# Patient Record
Sex: FEMALE | Race: WHITE
[De-identification: names, ages, dates, MRNs, and addresses within clinical notes are randomized per-mention and may not be internally consistent; named-entity substitution may affect disease eponyms.]

---

## 2018-04-06 ENCOUNTER — HOSPITAL ENCOUNTER (EMERGENCY)
Dept: HOSPITAL 17 - PHEFT | Age: 63
Discharge: HOME | End: 2018-04-06
Payer: COMMERCIAL

## 2018-04-06 VITALS
SYSTOLIC BLOOD PRESSURE: 145 MMHG | TEMPERATURE: 97.6 F | RESPIRATION RATE: 16 BRPM | OXYGEN SATURATION: 97 % | HEART RATE: 75 BPM | DIASTOLIC BLOOD PRESSURE: 65 MMHG

## 2018-04-06 VITALS — BODY MASS INDEX: 36.66 KG/M2 | HEIGHT: 65 IN | WEIGHT: 220.02 LBS

## 2018-04-06 DIAGNOSIS — E78.00: ICD-10-CM

## 2018-04-06 DIAGNOSIS — K21.9: ICD-10-CM

## 2018-04-06 DIAGNOSIS — Z79.82: ICD-10-CM

## 2018-04-06 DIAGNOSIS — E03.9: ICD-10-CM

## 2018-04-06 DIAGNOSIS — E11.9: ICD-10-CM

## 2018-04-06 DIAGNOSIS — Z79.899: ICD-10-CM

## 2018-04-06 DIAGNOSIS — H11.32: Primary | ICD-10-CM

## 2018-04-06 DIAGNOSIS — I10: ICD-10-CM

## 2018-04-06 PROCEDURE — 99283 EMERGENCY DEPT VISIT LOW MDM: CPT

## 2018-04-06 NOTE — PD
HPI


Chief Complaint:  Eye Problems/Injury


Time Seen by Provider:  15:08


Travel History


International Travel<30 days:  No


Contact w/Intl Traveler<30days:  No


Traveled to known affect area:  No





History of Present Illness


HPI


62-year-old female presents to the emergency department for evaluation of 

erythema to the left eye this started this morning.  Patient states she has 

always noticed a large blood vessel to the left lower conjunctiva.  She states 

she rubs her eye this morning and then looked in the mirror and noticed blood.  

Patient denies any trauma to the eye.  She does have history of high blood 

pressure and diabetes.  She denies any visual changes.  She does state that she 

has a slight pressure behind the left eye.  She is not on anticoagulants.  Mild 

severity.  Patient also states that she has had pruritus to her bilateral upper 

extremities for "a long time".  She has not seen her primary care physician for 

it, but that when she was here she would mention it to me.





PFSH


Past Medical History


Hx Anticoagulant Therapy:  Yes (BABY ASA DAILY)


Cardiovascular Problems:  Yes (HTN,CHOL)


Diabetes:  Yes


Patient Takes Glucophage:  Yes (4/6/18 0730)


Diminished Hearing:  No


GERD:  Yes


Hypertension:  Yes


Thyroid Disease:  Yes (hypo)


Tetanus Vaccination:  < 5 Years


Influenza Vaccination:  Yes


Pregnant?:  Not Pregnant





Past Surgical History


Endocrine Surgery:  Yes (thyroid removed)


Hysterectomy:  Yes (partial )





Social History


Alcohol Use:  Yes (occass.)


Tobacco Use:  No


Substance Use:  No





Allergies-Medications


(Allergen,Severity, Reaction):  


Coded Allergies:  


     No Known Allergies (Verified  Adverse Reaction, Unknown, 4/6/18)


Reported Meds & Prescriptions





Reported Meds & Active Scripts


Active


Reported


Melatonin 5 Mg Tab 10 Mg PO HS


Invokana (Canagliflozin) 300 Mg Tab 300 Mg PO DAILY


     Take before 1st meal of day.


Glipizide 10 Mg Tab 10 Mg PO BIDAC


     Take 30 minutes before a meal


Omega-3 Fish Oil/Vitamin (Fish Oil-Cholecalciferol) 1,000-1,000 Mg Cap 1 Cap PO 

BID


Metformin (Metformin HCl) 1,000 Mg Tab 1,000 Mg PO DAILY


     With a meal


Lisinopril 20 Mg Tab 20 Mg PO DAILY


Levothyroxine (Levothyroxine Sodium) 137 Mcg Tab 137 Mcg PO DAILY


Hydrochlorothiazide 50 Mg Tab 50 Mg PO DAILY


Celexa (Citalopram Hydrobromide) 40 Mg Tab 40 Mg PO DAILY


Atenolol 25 Mg Tab 25 Mg PO DAILY


Aspirin 81 (Aspirin) 81 Mg Tabdr 81 Mg PO DAILY


[Omeprazole]   20 Mg PO 


[Calcium]   1,200  BID








Review of Systems


Except as stated in HPI:  all other systems reviewed are Neg





Physical Exam


Narrative


GENERAL: Well-nourished, well-developed female patient, ambulatory.  Afebrile


SKIN: Focused skin assessment warm/dry.


HEAD: Normocephalic.  Atraumatic


EYES: No scleral icterus.  Subconjunctival hemorrhage noted to left 

conjunctiva.  IOP is 16 in the left eye and 20 in the right eye.  Fluorescein 

examination is negative.


NECK: Supple, trachea midline. No JVD or lymphadenopathy.


CARDIOVASCULAR: Regular rate and rhythm without murmurs, gallops, or rubs. 


RESPIRATORY: Breath sounds equal bilaterally. No accessory muscle use.  Lung 

sounds are clear to auscultation.


GASTROINTESTINAL: Abdomen soft, non-tender, nondistended. 


MUSCULOSKELETAL: No cyanosis, or edema. 


BACK: Nontender without obvious deformity. No CVA tenderness.





Data


Data


Last Documented VS





Vital Signs








  Date Time  Temp Pulse Resp B/P (MAP) Pulse Ox O2 Delivery O2 Flow Rate FiO2


 


4/6/18 14:35 97.6 75 16 145/65 (91) 97   











MDM


Medical Decision Making


Medical Screen Exam Complete:  Yes


Emergency Medical Condition:  Yes


Medical Record Reviewed:  Yes


Differential Diagnosis


Subconjunctival hemorrhage versus conjunctivitis versus acute glaucoma versus 

laceration


Narrative Course


62-year-old female presents to the emergency department for evaluation of some 

gingival hemorrhage after she rubbed her eye this morning.  She did state that 

she had some pressure behind the eyes so pressures were checked which were 

within normal limits.  I did instruct the patient to follow-up with an 

ophthalmologist since she does have history of hypertension diabetes.  She 

verbalizes agreement.





Diagnosis





 Primary Impression:  


 Subconjunctival hemorrhage of left eye


Referrals:  


Mimi Long MD


call for appointment


Patient Instructions:  General Instructions, Subconjunctival Hemorrhage (ED)





***Additional Instructions:  


Follow-up with ophthalmologist.


Return to the emergency department for any acute worsening of symptoms


***Med/Other Pt SpecificInfo:  No Change to Meds


Disposition:  01 DISCHARGE HOME


Condition:  Stable











Marybel Naik Apr 6, 2018 15:31